# Patient Record
Sex: FEMALE | Race: WHITE
[De-identification: names, ages, dates, MRNs, and addresses within clinical notes are randomized per-mention and may not be internally consistent; named-entity substitution may affect disease eponyms.]

---

## 2017-08-15 ENCOUNTER — HOSPITAL ENCOUNTER (OUTPATIENT)
Dept: HOSPITAL 17 - PHED | Age: 42
Setting detail: OBSERVATION
LOS: 1 days | Discharge: HOME | End: 2017-08-16
Attending: HOSPITALIST | Admitting: HOSPITALIST
Payer: MEDICAID

## 2017-08-15 VITALS
RESPIRATION RATE: 18 BRPM | HEART RATE: 87 BPM | DIASTOLIC BLOOD PRESSURE: 81 MMHG | SYSTOLIC BLOOD PRESSURE: 130 MMHG | OXYGEN SATURATION: 96 % | TEMPERATURE: 98.5 F

## 2017-08-15 VITALS — HEART RATE: 95 BPM | DIASTOLIC BLOOD PRESSURE: 76 MMHG | SYSTOLIC BLOOD PRESSURE: 116 MMHG | OXYGEN SATURATION: 95 %

## 2017-08-15 VITALS — WEIGHT: 160.94 LBS | HEIGHT: 60 IN | BODY MASS INDEX: 31.6 KG/M2

## 2017-08-15 VITALS
OXYGEN SATURATION: 97 % | HEART RATE: 68 BPM | SYSTOLIC BLOOD PRESSURE: 117 MMHG | DIASTOLIC BLOOD PRESSURE: 86 MMHG | TEMPERATURE: 96.9 F | RESPIRATION RATE: 20 BRPM

## 2017-08-15 VITALS — OXYGEN SATURATION: 96 %

## 2017-08-15 VITALS — SYSTOLIC BLOOD PRESSURE: 108 MMHG | DIASTOLIC BLOOD PRESSURE: 74 MMHG | OXYGEN SATURATION: 97 % | HEART RATE: 81 BPM

## 2017-08-15 VITALS — DIASTOLIC BLOOD PRESSURE: 81 MMHG | SYSTOLIC BLOOD PRESSURE: 133 MMHG

## 2017-08-15 VITALS — HEART RATE: 70 BPM

## 2017-08-15 DIAGNOSIS — R94.31: ICD-10-CM

## 2017-08-15 DIAGNOSIS — J98.11: ICD-10-CM

## 2017-08-15 DIAGNOSIS — G89.29: ICD-10-CM

## 2017-08-15 DIAGNOSIS — R06.02: ICD-10-CM

## 2017-08-15 DIAGNOSIS — Z82.49: ICD-10-CM

## 2017-08-15 DIAGNOSIS — F17.210: ICD-10-CM

## 2017-08-15 DIAGNOSIS — M54.2: ICD-10-CM

## 2017-08-15 DIAGNOSIS — R07.89: Primary | ICD-10-CM

## 2017-08-15 LAB
ANION GAP SERPL CALC-SCNC: 8 MEQ/L (ref 5–15)
APTT BLD: 27.4 SEC (ref 24.3–30.1)
BASOPHILS # BLD AUTO: 0.2 TH/MM3 (ref 0–0.2)
BASOPHILS NFR BLD: 2.4 % (ref 0–2)
BETA HCG QUANT: (no result) MIU/ML (ref 0–5)
BUN SERPL-MCNC: 9 MG/DL (ref 7–18)
CHLORIDE SERPL-SCNC: 109 MEQ/L (ref 98–107)
CK SERPL-CCNC: 49 U/L (ref 26–192)
CK SERPL-CCNC: 51 U/L (ref 26–192)
CK SERPL-CCNC: 70 U/L (ref 26–192)
EOSINOPHIL # BLD: 0.1 TH/MM3 (ref 0–0.4)
EOSINOPHIL NFR BLD: 1.1 % (ref 0–4)
ERYTHROCYTE [DISTWIDTH] IN BLOOD BY AUTOMATED COUNT: 12.5 % (ref 11.6–17.2)
GFR SERPLBLD BASED ON 1.73 SQ M-ARVRAT: 89 ML/MIN (ref 89–?)
HCO3 BLD-SCNC: 21.3 MEQ/L (ref 21–32)
HCT VFR BLD CALC: 43.3 % (ref 35–46)
HEMO FLAGS: (no result)
INR PPP: 1 RATIO
LYMPHOCYTES # BLD AUTO: 2.1 TH/MM3 (ref 1–4.8)
LYMPHOCYTES NFR BLD AUTO: 21 % (ref 9–44)
MAGNESIUM SERPL-MCNC: 1.9 MG/DL (ref 1.5–2.5)
MCH RBC QN AUTO: 31.9 PG (ref 27–34)
MCHC RBC AUTO-ENTMCNC: 34.6 % (ref 32–36)
MCV RBC AUTO: 92.3 FL (ref 80–100)
MONOCYTES NFR BLD: 3.5 % (ref 0–8)
NEUTROPHILS # BLD AUTO: 7.2 TH/MM3 (ref 1.8–7.7)
NEUTROPHILS NFR BLD AUTO: 72 % (ref 16–70)
PLATELET # BLD: 270 TH/MM3 (ref 150–450)
POTASSIUM SERPL-SCNC: 3.9 MEQ/L (ref 3.5–5.1)
PROTHROMBIN TIME: 10.8 SEC (ref 9.8–11.6)
RBC # BLD AUTO: 4.69 MIL/MM3 (ref 4–5.3)
SODIUM SERPL-SCNC: 138 MEQ/L (ref 136–145)
WBC # BLD AUTO: 10 TH/MM3 (ref 4–11)

## 2017-08-15 PROCEDURE — 84702 CHORIONIC GONADOTROPIN TEST: CPT

## 2017-08-15 PROCEDURE — 85610 PROTHROMBIN TIME: CPT

## 2017-08-15 PROCEDURE — 84484 ASSAY OF TROPONIN QUANT: CPT

## 2017-08-15 PROCEDURE — 85025 COMPLETE CBC W/AUTO DIFF WBC: CPT

## 2017-08-15 PROCEDURE — 87641 MR-STAPH DNA AMP PROBE: CPT

## 2017-08-15 PROCEDURE — 99285 EMERGENCY DEPT VISIT HI MDM: CPT

## 2017-08-15 PROCEDURE — G0378 HOSPITAL OBSERVATION PER HR: HCPCS

## 2017-08-15 PROCEDURE — 71010: CPT

## 2017-08-15 PROCEDURE — 93005 ELECTROCARDIOGRAM TRACING: CPT

## 2017-08-15 PROCEDURE — 82550 ASSAY OF CK (CPK): CPT

## 2017-08-15 PROCEDURE — 93017 CV STRESS TEST TRACING ONLY: CPT

## 2017-08-15 PROCEDURE — A9502 TC99M TETROFOSMIN: HCPCS

## 2017-08-15 PROCEDURE — 83735 ASSAY OF MAGNESIUM: CPT

## 2017-08-15 PROCEDURE — 80048 BASIC METABOLIC PNL TOTAL CA: CPT

## 2017-08-15 PROCEDURE — 78452 HT MUSCLE IMAGE SPECT MULT: CPT

## 2017-08-15 PROCEDURE — 85730 THROMBOPLASTIN TIME PARTIAL: CPT

## 2017-08-15 PROCEDURE — 85379 FIBRIN DEGRADATION QUANT: CPT

## 2017-08-15 RX ADMIN — Medication SCH ML: at 21:18

## 2017-08-15 NOTE — PD
HPI


Chief Complaint:  chest pain


Time Seen by Provider:  16:11


Travel History


International Travel<30 days:  No


Contact w/Intl Traveler<30days:  No


Traveled to known affect area:  No





History of Present Illness


HPI


41-year-old female here for evaluation of substernal chest pain that started 

last night.  The patient reports substernal chest ache/heaviness that started 

last night.  Currently the pain is 5 out of 10 and is worse with exertion.  The 

pain does not radiate.  She went to an urgent care facility today where an EKG 

was performed and was reviewed and shows no ST segment abnormalities.  Patient 

smokes cigarettes daily.  She denies any known history of coronary artery 

disease.  She has a history of asthma.  No history of DVT or PE.  She reports 

family history of heart disease on her father's side.  No paresthesias or motor 

deficits.





PFSH


Past Medical History


Asthma:  Yes


Blood Disorders:  No


Cancer:  No


Cardiovascular Problems:  No


COPD:  No


Diminished Hearing:  No


Endocrine:  No


Genitourinary:  No


Immune Disorder:  No


Musculoskeletal:  Yes (BACK INJURY 2010)


Neurologic:  No


Psychiatric:  No


Reproductive:  No


Respiratory:  Yes (hx of asthma as a child)


Immunizations Current:  No


Sleep Apnea:  No


Menopausal:  No


:  5


Para:  2


Miscarriage:  2


:  1


Ectopic Pregnancy:  Yes (X 2)





Past Surgical History


Abdominal Surgery:  Yes (CSECTION)


AICD:  No


Arteriovenous Shunt:  No


Cardiac Surgery:  No


 Section:  Yes (X2)


Ear Surgery:  No


Endocrine Surgery:  No


Eye Surgery:  No


Gynecologic Surgery:  Yes


Insulin Pump:  No


Joint Replacement:  No


Oral Surgery:  No


Pacemaker:  No


Thoracic Surgery:  No


Other Surgery:  Yes





Social History


Alcohol Use:  No


Tobacco Use:  Yes (1PPD)


Substance Use:  No





Allergies-Medications


(Allergen,Severity, Reaction):  


Coded Allergies:  


     cyclobenzaprine (Unverified  Adverse Reaction, Severe, Nausea/Vomiting, 8/

15/17)


Reported Meds & Prescriptions





Reported Meds & Active Scripts


Active


Reported


Robaxin (Methocarbamol) 750 Mg Tab 750 Mg PO Q4H


Hydrocodone-Acetaminophen 7.5-325 mg Tab 1 Tab PO Q6H PRN


Lorazepam 0.5 Mg Tab 0.5 Mg PO Q8H PRN








Review of Systems


Except as stated in HPI:  all other systems reviewed are Neg





Physical Exam


Narrative


GENERAL: Well-developed, well-nourished, comfortable, no apparent distress.


SKIN: Focused skin assessment warm/dry.


HEAD: Atraumatic. Normocephalic. 


EYES: Pupils equal and round. No scleral icterus. No injection or drainage. 


ENT: Mucous membranes pink and moist.


NECK: Trachea midline. No JVD. 


CARDIOVASCULAR: Regular rate and rhythm.  Distal pulses brisk and equal 

bilaterally.


RESPIRATORY: No accessory muscle use. Clear to auscultation. Breath sounds 

equal bilaterally. 


GASTROINTESTINAL: Abdomen soft, non-tender, nondistended. 


MUSCULOSKELETAL: No obvious deformities. No clubbing.  No cyanosis.  No edema. 


NEUROLOGICAL: Awake and alert. No obvious cranial nerve deficits.  Motor 

grossly within normal limits. Normal speech.


PSYCHIATRIC: Appropriate mood and affect; insight and judgment normal.





Data


Data


Last Documented VS





Vital Signs








  Date Time  Temp Pulse Resp B/P Pulse Ox O2 Delivery O2 Flow Rate FiO2


 


8/15/17 17:40  81  108/74 97   


 


8/15/17 16:39 98.5  18     








Orders





 Electrocardiogram (8/15/17 16:16)


Basic Metabolic Panel (Bmp) (8/15/17 16:16)


Ckmb (Isoenzyme) Profile (8/15/17 16:16)


Complete Blood Count With Diff (8/15/17 16:16)


D-Dimer (8/15/17 16:16)


Magnesium (Mg) (8/15/17 16:16)


Prothrombin Time / Inr (Pt) (8/15/17 16:16)


Act Partial Throm Time (Ptt) (8/15/17 16:16)


Troponin I (8/15/17 16:16)


Chest, Single Ap (8/15/17 16:16)


Ecg Monitoring (8/15/17 16:16)


Bilateral Bp Monitoring (8/15/17 16:16)


Iv Access Insert/Monitor (8/15/17 16:16)


Oximetry (8/15/17 16:16)


Oxygen Administration (8/15/17 16:16)


Aspirin Chew (Aspirin Chew) (8/15/17 16:30)


Sodium Chloride 0.9% Flush (Ns Flush) (8/15/17 16:30)


Nitroglycerin Sl (Nitrostat Sl) (8/15/17 16:30)


Beta Hcg (Quant/Titer) (8/15/17 16:16)





Labs





 Laboratory Tests








Test 8/15/17





 16:36


 


White Blood Count 10.0 TH/MM3


 


Red Blood Count 4.69 MIL/MM3


 


Hemoglobin 15.0 GM/DL


 


Hematocrit 43.3 %


 


Mean Corpuscular Volume 92.3 FL


 


Mean Corpuscular Hemoglobin 31.9 PG


 


Mean Corpuscular Hemoglobin 34.6 %





Concent 


 


Red Cell Distribution Width 12.5 %


 


Platelet Count 270 TH/MM3


 


Mean Platelet Volume 8.2 FL


 


Neutrophils (%) (Auto) 72.0 %


 


Lymphocytes (%) (Auto) 21.0 %


 


Monocytes (%) (Auto) 3.5 %


 


Eosinophils (%) (Auto) 1.1 %


 


Basophils (%) (Auto) 2.4 %


 


Neutrophils # (Auto) 7.2 TH/MM3


 


Lymphocytes # (Auto) 2.1 TH/MM3


 


Monocytes # (Auto) 0.4 TH/MM3


 


Eosinophils # (Auto) 0.1 TH/MM3


 


Basophils # (Auto) 0.2 TH/MM3


 


CBC Comment DIFF FINAL 


 


Differential Comment  


 


Prothrombin Time 10.8 SEC


 


Prothromb Time International 1.0 RATIO





Ratio 


 


Activated Partial 27.4 SEC





Thromboplast Time 


 


D-Dimer Quantitative (PE/DVT) 0.20 MG/L FEU


 


Sodium Level 138 MEQ/L


 


Potassium Level 3.9 MEQ/L


 


Chloride Level 109 MEQ/L


 


Carbon Dioxide Level 21.3 MEQ/L


 


Anion Gap 8 MEQ/L


 


Blood Urea Nitrogen 9 MG/DL


 


Creatinine 0.72 MG/DL


 


Estimat Glomerular Filtration 89 ML/MIN





Rate 


 


Random Glucose 83 MG/DL


 


Calcium Level 8.8 MG/DL


 


Magnesium Level 1.9 MG/DL


 


Total Creatine Kinase 70 U/L


 


Troponin I LESS THAN 0.02





 NG/ML


 


Human Chorionic Gonadotropin, LESS THAN 1





Quant MIU/ML











MDM


Medical Decision Making


Medical Screen Exam Complete:  Yes


Emergency Medical Condition:  Yes


Interpretation(s)


EKG: Sinus, rate 88, normal axis, normal intervals, no acute ischemic 

abnormality.


Differential Diagnosis


ACS, pneumothorax, pericarditis, PE, pneumonia, musculoskeletal pain


Narrative Course


Initial vital signs show heart rate 87, blood pressure 130/81, pulse ox 96% on 

room air, oral temp of 98.5F.





CBC is unremarkable.


BMP is unremarkable.


Cardiac enzymes are negative.


D-dimer is negative at 0.20.


Beta hCG is negative.





Chest x-ray:


Small area of atelectasis at the right lung base.





Patient was given a full aspirin and one sublingual nitroglycerin with 

improvement in chest tightness.  She still has a slight pain in her chest.  On 

reassessment she is sleeping comfortably.  Patient is a smoker and has a strong 

family history of heart disease.  Given these risk factors she will be admitted 

to the chest pain center for further cardiac evaluation.  She is amenable to 

this plan.





Case discussed with hospitalist Dr Vargas who will admit the patient to his 

service.





Diagnosis





 Primary Impression:  


 Chest pain


 Qualified Code:  R07.9 - Chest pain, unspecified type





Admitting Information


Admitting Physician Requests:  Observation








Santhosh Chan MD Aug 15, 2017 16:18


Santhosh Chan MD Aug 15, 2017 16:18

## 2017-08-15 NOTE — RADRPT
EXAM DATE/TIME:  08/15/2017 16:47 

 

[facility] COMPARISON:     

No previous studies available for comparison.

 

                     

INDICATIONS :     

Chest pain. 

                     

 

MEDICAL HISTORY :     

None.          

 

SURGICAL HISTORY :     

None.   

 

ENCOUNTER:     

Initial                                        

 

ACUITY:     

1 day      

 

PAIN SCORE:     

7/10

 

LOCATION:     

Bilateral chest 

 

FINDINGS:     

There is some mild platelike atelectasis at the right lung base. The lungs are otherwise clear. The h
eart is normal in size. The bony structures are intact.

 

CONCLUSION:     

Small area of atelectasis at the right lung base. 

 

 

 Franky Maria MD on August 15, 2017 at 17:09           

Board Certified Radiologist.

 This report was verified electronically.

## 2017-08-16 VITALS
SYSTOLIC BLOOD PRESSURE: 102 MMHG | OXYGEN SATURATION: 98 % | DIASTOLIC BLOOD PRESSURE: 65 MMHG | RESPIRATION RATE: 20 BRPM | TEMPERATURE: 96.7 F | HEART RATE: 72 BPM

## 2017-08-16 VITALS
RESPIRATION RATE: 14 BRPM | DIASTOLIC BLOOD PRESSURE: 69 MMHG | SYSTOLIC BLOOD PRESSURE: 124 MMHG | TEMPERATURE: 97.9 F | HEART RATE: 73 BPM | OXYGEN SATURATION: 96 %

## 2017-08-16 VITALS
DIASTOLIC BLOOD PRESSURE: 69 MMHG | HEART RATE: 72 BPM | TEMPERATURE: 96 F | SYSTOLIC BLOOD PRESSURE: 101 MMHG | OXYGEN SATURATION: 97 % | RESPIRATION RATE: 20 BRPM

## 2017-08-16 VITALS — OXYGEN SATURATION: 96 %

## 2017-08-16 VITALS — OXYGEN SATURATION: 97 %

## 2017-08-16 RX ADMIN — Medication SCH ML: at 10:03

## 2017-08-16 NOTE — EKG
Date Performed: 08/15/2017       Time Performed: 20:44:12

 

PTAGE:      41 years

 

EKG:      Sinus rhythm 

 

 NORMAL ECG

 

PREVIOUS TRACING       : 08/15/2017 18.12 Compared to prior tracing no significant change

 

DOCTOR:   Narinder Oneill  Interpretating Date/Time  08/16/2017 00:16:31

## 2017-08-16 NOTE — EKG
Date Performed: 08/15/2017       Time Performed: 18:12:12

 

PTAGE:      41 years

 

EKG:      Sinus rhythm 

 

 NORMAL ECG

 

PREVIOUS TRACING       : 08/15/2017 16.21 Compared to prior tracing no significant change

 

DOCTOR:   Narinder Oneill  Interpretating Date/Time  08/16/2017 00:28:43

## 2017-08-16 NOTE — EKG
Date Performed: 08/15/2017       Time Performed: 16:21:07

 

PTAGE:      41 years

 

EKG:      Sinus rhythm 

 

 NORMAL ECG INTERPRETATION BASED ON A DEFAULT AGE OF 40 YEARS Compared to the 

 

 PREVIOUS TRACING            , no significant change

 

DOCTOR:   Narinder Oneill  Interpretating Date/Time  08/16/2017 00:32:45

## 2017-08-16 NOTE — RADRPT
EXAM DATE/TIME:  2017 11:31 

 

[facility] COMPARISON:     

No previous studies available for comparison.

 

 

INDICATIONS :     

Substernal chest pain. Angina. Abnormal EKG.

                           

 

DOSE:     

25.7 mCi Tc99m Myoview at stress.

                     8.1 mCi Tc99m Myoview at rest.

                     0.4 mg Lexisca

 

 

STRESS SYMPTOMS:      

Head pressure and dizzy.

                       

 

EJECTION FRACTION:       

66%

                       

 

MEDICAL HISTORY :        

Smoker and asthma.

 

SURGICAL HISTORY :       

 section.  

 

ENCOUNTER:     

Initial

 

ACUITY:     

1 day

 

PAIN SCALE:     

5/10

 

LOCATION:      

Substernal chest 

 

TECHNIQUE:     

The patient underwent pharmacologic stress with infusion of prescribed dose.  Continuous ECG tracing 
was monitored during stress.  Gated SPECT imaging was performed after stress and conventional SPECT i
maging was performed at rest.  The examination was performed on a SPECT/CT scanner, both attenuation 
and non-corrected datasets were reviewed.

 

FINDINGS:     

 

DISTRIBUTION:     

The maximum perfused segment at stress is in the inferior wall.

 

PERFUSION STUDY:     

The pattern of perfusion at stress is within normal limits.

 

GATED STUDY:     

There is intact wall motion and thickening without hypokinetic or dyskinetic segments. 

 

CONCLUSION:     

1. Normal wall motion and calculated ejection fraction.

2. No fixed or reversible wall defects.

 

RISK CATEGORY:     

Low (<1% Annual Mortality Rate)

 

 

 

 

 Bal Mendes MD on 2017 at 12:43           

Board Certified Radiologist.

 This report was verified electronically.

## 2017-08-16 NOTE — HHI.HP
__________________________________________________





HPI


Service


[facility]


Primary Care Physician


Curt Pressley M.D.


Admission Diagnosis


Chest Pain


Diagnoses:  


(1) Chest pain


Diagnosis:  Principal





Chief Complaint:  


Chest discomfort


Travel History


International Travel<30 Days:  No


Contact w/Intl Traveler <30 Da:  No


Traveled to Known Affected Are:  No


History of Present Illness


Written by Jacques Gomez, acting as scribe for Dr. Vargas on 17 at 09:

14. 





Is a 41-year-old  female with chronic neck and back pain who presented 

to the hospital because of chest discomfort.  Patient states that the pain 

started 2 days ago while she was sitting and watching TV.  She describes it as 

a pressure 8/10 on a pain scale located over the mid sternum and has remained 

constant for the last 2 days without any changing.  She indicates that she did 

have some nausea, shortness of breath, lightheaded dizziness, cough since the 

chest discomfort started.  Since the pain was persistent she came to the 

emergency department for evaluation.  Patient was given nitroglycerin and the 

pain did improve to a 2/10 on a pain scale.  The pain is persistent at this 

time and is reproducible on palpation.  Patient states that she has been 

working in the yard recently, denies any heavy lifting, straining.  Patient had 

workup done in the emergency department and is recommended that the patient be 

observed in the chest pain center.





Review of Systems


Cardiovascular:  COMPLAINS OF: Chest pain


Except as stated in HPI:  all other systems reviewed are Neg





Past Family Social History


Past Medical History


Chronic neck and back pain


Chronic smoker


Past Surgical History


 2


Ectopic pregnancy 2


Reported Medications





Reported Meds & Active Scripts


Active


Reported


Robaxin (Methocarbamol) 750 Mg Tab 750 Mg PO Q4H


Hydrocodone-Acetaminophen 7.5-325 mg Tab 1 Tab PO Q6H PRN


Lorazepam 0.5 Mg Tab 0.5 Mg PO Q8H PRN


Allergies:  


Coded Allergies:  


     cyclobenzaprine (Unverified  Adverse Reaction, Severe, Nausea/Vomiting, 8/

15/17)


Family History


Reviewed and patient states that she is adopted, however she has learned that 

there is heart disease and cancer, but does not know specifics


Social History


Patient smokes 1 pack a cigarettes a day since she was 14 years old, Patient 

denies any alcohol or illicit drugs





Physical Exam


Vital Signs





 Vital Signs








  Date Time  Temp Pulse Resp B/P Pulse Ox O2 Delivery O2 Flow Rate FiO2


 


17 08:52 97.9 73 14 124/69 96   


 


17 08:13     97 Nasal Cannula 1.00 


 


17 04:00 96.0 72 20 101/69 97   


 


17 01:55     96 Nasal Cannula 1.50 


 


17 00:00 96.7 72 20 102/65 98   


 


8/15/17 23:00  70      


 


8/15/17 20:00 96.9 68 20 117/86 97   


 


8/15/17 20:00  73      


 


8/15/17 17:40  81  108/74 97   


 


8/15/17 17:14  95  116/76 95   


 


8/15/17 16:53    130/81    





    133/76    


 


8/15/17 16:46     96   


 


8/15/17 16:46  85   96   


 


8/15/17 16:39 98.5 87 18 130/81 96   








Physical Exam


GENERAL: Well-developed, well-nourished, in no acute distress. alert and 

orientated


HEENT: Head is normocephalic without any lesions or masses noted. Facial 

features are symmetric. Eyes: Pupils equal round reactive to light. Extraocular 

muscles are intact. Conjunctivae were clear. Oropharyngeal: Pharynx without any 

erythema edema. Tongue is midline without deviation. Buccal mucosa is moist 

without any masses or lesions


NECK: Supple without any masses. Trachea midline no deviation. No JVD, no 

bruits are appreciated


CARDIAC: Regular rhythm, regular rate. S1/S2 are heard. No murmurs gallops or 

rubs.  Reproducible palpable tenderness noted over the mid sternum


LUNGS: Clear to auscultation bilaterally. No wheeze, rhonchi or rales. No use 

of accessory muscles on inspiration or expiration.


ABDOMEN: Soft, nontender. Nondistended. Bowel sounds heard in all 4 quadrants. 

No organomegaly or masses. Negative rebound, negative guarding


EXTREMITIES: No edema, pulses are equal bilaterally. No cyanosis or clubbing


NEUROLOGY: Mood and affect appear appropriate. Cranial nerves II through XII 

grossly intact. Muscle strength 5/5 in upper and lower extremities bilaterally. 

Deep tendon reflexes are 2+ in upper and lower extremities bilaterally.


Laboratory





Laboratory Tests








Test 8/15/17 8/15/17 8/15/17





 16:36 20:50 22:35


 


White Blood Count 10.0   


 


Red Blood Count 4.69   


 


Hemoglobin 15.0   


 


Hematocrit 43.3   


 


Mean Corpuscular Volume 92.3   


 


Mean Corpuscular Hemoglobin 31.9   


 


Mean Corpuscular Hemoglobin 34.6   





Concent   


 


Red Cell Distribution Width 12.5   


 


Platelet Count 270   


 


Mean Platelet Volume 8.2   


 


Neutrophils (%) (Auto) 72.0   


 


Lymphocytes (%) (Auto) 21.0   


 


Monocytes (%) (Auto) 3.5   


 


Eosinophils (%) (Auto) 1.1   


 


Basophils (%) (Auto) 2.4   


 


Neutrophils # (Auto) 7.2   


 


Lymphocytes # (Auto) 2.1   


 


Monocytes # (Auto) 0.4   


 


Eosinophils # (Auto) 0.1   


 


Basophils # (Auto) 0.2   


 


CBC Comment DIFF FINAL   


 


Differential Comment    


 


Prothrombin Time 10.8   


 


Prothromb Time International 1.0   





Ratio   


 


Activated Partial 27.4   





Thromboplast Time   


 


D-Dimer Quantitative (PE/DVT) 0.20   


 


Sodium Level 138   


 


Potassium Level 3.9   


 


Chloride Level 109   


 


Carbon Dioxide Level 21.3   


 


Anion Gap 8   


 


Blood Urea Nitrogen 9   


 


Creatinine 0.72   


 


Estimat Glomerular Filtration 89   





Rate   


 


Random Glucose 83   


 


Calcium Level 8.8   


 


Magnesium Level 1.9   


 


Total Creatine Kinase 70  51  49 


 


Troponin I LESS THAN 0.02  LESS THAN 0.02  LESS THAN 0.02 


 


Human Chorionic Gonadotropin, LESS THAN 1   





Quant   








Result Diagram:  


8/15/17 1636                                                                   

             8/15/17 1636





Imaging





Last Impressions








Chest X-Ray 8/15/17 1616 Signed





Impressions: 





 Service Date/Time:  Tuesday, August 15, 2017 16:47 - CONCLUSION:  Small area 

of 





 atelectasis at the right lung base.     Franky Maria MD 











Assessment and Plan


Assessment and Plan


Chest pain, atypical


Patient with increased risk factor to include tobacco use, family history of 

heart disease


Patient has been ruled out for any acute coronary event with serial cardiac 

enzymes which are negative, serial EKGs which show sinus rhythm without any 

changes


We'll pursue nuclear stress test at this time, patient indicates that she 

cannot do an exercise stress test. If negative she will be discharged home. 


Continue on aspirin, nitroglycerin as needed, morphine as needed.





Chronic neck and back pain


Continue as needed pain control





DVT prevention


Low risk, early ambulation








Discharge disposition





Discharge home in stable condition





Activity: Ad pre[place].





Diet: Regular diet





Medications per medication reconciliation





Follow-up primary medical doctor one week





This note was transcribed by scribe [Jacques Gomez].  I, Dr. Kaitlin Vargas personally performed the history, physical exam, and medical decision 

making; and confirmed the accuracy of the information in the transcribed note.





Authenticated by Dr. Kaitlin Vargas on 17 at 0915.





Problem Qualifiers





(1) Chest pain:  


Qualified Code:  R07.9 - Chest pain, unspecified type





Jacques Gomez Aug 16, 2017 09:22


Kaitlin Vargas MD Aug 16, 2017 10:46

## 2017-08-16 NOTE — HHI.DCPOC
Discharge Care Plan


Diagnosis:  


(1) Chest pain


Goals to Promote Your Health


* To prevent worsening of your condition and complications


* To maintain your health at the optimal level


Directions to Meet Your Goals


*** Take your medications as prescribed


*** Follow your dietary instruction


*** Follow activity as directed








*** Keep your appointments as scheduled


*** Take your immunizations and boosters as scheduled


*** If your symptoms worsen call your PCP, if no PCP go to Urgent Care Center 

or Emergency Room***


*** Smoking is Dangerous to Your Health. Avoid second hand smoke***


***Call the 24-hour hour crisis hotline for domestic abuse at 1-106.541.5360***








Jacques Gomez Aug 16, 2017 13:40

## 2018-06-09 ENCOUNTER — HOSPITAL ENCOUNTER (EMERGENCY)
Dept: HOSPITAL 17 - PHED | Age: 43
Discharge: HOME | End: 2018-06-09
Payer: MEDICAID

## 2018-06-09 VITALS
OXYGEN SATURATION: 97 % | TEMPERATURE: 98.3 F | DIASTOLIC BLOOD PRESSURE: 73 MMHG | SYSTOLIC BLOOD PRESSURE: 120 MMHG | RESPIRATION RATE: 16 BRPM | HEART RATE: 94 BPM

## 2018-06-09 VITALS — BODY MASS INDEX: 32.81 KG/M2 | HEIGHT: 60 IN | WEIGHT: 167.11 LBS

## 2018-06-09 DIAGNOSIS — M54.9: ICD-10-CM

## 2018-06-09 DIAGNOSIS — J45.909: ICD-10-CM

## 2018-06-09 DIAGNOSIS — G89.29: ICD-10-CM
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
## 2018-06-09 DIAGNOSIS — S89.91XA: Primary | ICD-10-CM

## 2018-06-09 DIAGNOSIS — F41.9: ICD-10-CM

## 2018-06-09 DIAGNOSIS — F17.200: ICD-10-CM

## 2018-06-09 PROCEDURE — 73564 X-RAY EXAM KNEE 4 OR MORE: CPT

## 2018-06-09 PROCEDURE — E0113 CRUTCH UNDERARM EACH WOOD: HCPCS

## 2018-06-09 PROCEDURE — 99283 EMERGENCY DEPT VISIT LOW MDM: CPT

## 2018-06-09 PROCEDURE — L1830 KO IMMOB CANVAS LONG PRE OTS: HCPCS

## 2018-06-09 NOTE — RADRPT
EXAM DATE:  6/9/2018 10:09 AM EDT

AGE/SEX:        42 years / Female



INDICATIONS:  Twisted right knee. Unable to bear weight with pain.



CLINICAL DATA:  This is the patient's initial encounter. Patient reports that signs and symptoms have
 been present for 1 day and indicates a pain score of 7/10. 

                                                                          

MEDICAL/SURGICAL HISTORY:       None. None.



COMPARISON:      No prior exams available for comparison. 





FINDINGS:  

Bony structures are intact and in normal alignment.  Joints are intact without dislocation or signifi
cant arthropathy.  Osseous density is normal.  Soft tissues are unremarkable.  No radiopaque foreign 
bodies seen.  



CONCLUSION: 

No evidence of recent bony injury.



 







Electronically signed by: Ugo Rey MD  6/9/2018 10:23 AM EDT